# Patient Record
Sex: FEMALE | Race: BLACK OR AFRICAN AMERICAN
[De-identification: names, ages, dates, MRNs, and addresses within clinical notes are randomized per-mention and may not be internally consistent; named-entity substitution may affect disease eponyms.]

---

## 2016-09-01 VITALS — SYSTOLIC BLOOD PRESSURE: 159 MMHG | DIASTOLIC BLOOD PRESSURE: 101 MMHG

## 2017-02-09 ENCOUNTER — HOSPITAL ENCOUNTER (EMERGENCY)
Dept: HOSPITAL 61 - ER | Age: 32
Discharge: HOME | End: 2017-02-09
Payer: SELF-PAY

## 2017-02-09 VITALS — HEIGHT: 66 IN | WEIGHT: 293 LBS | BODY MASS INDEX: 47.09 KG/M2
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
## 2017-02-09 DIAGNOSIS — Z88.6: ICD-10-CM
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
## 2017-02-09 DIAGNOSIS — M54.32: ICD-10-CM
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
## 2017-02-09 DIAGNOSIS — S39.012A: Primary | ICD-10-CM

## 2017-02-09 PROCEDURE — 99283 EMERGENCY DEPT VISIT LOW MDM: CPT

## 2017-02-09 NOTE — PHYS DOC
Past Medical History


Past Medical History:  No Pertinent History


Past Surgical History:  Other


Additional Past Surgical Histo:  SPLEENECTOMY


Alcohol Use:  None


Drug Use:  None





Adult General


Chief Complaint


Chief Complaint:  BACK PAIN OR INJURY





HPI


HPI


Patient is a 31  year old female who presents with moderate left low back pain 

radiating to the left lower extremity that began 3 days ago after she moved a 

TV. Patient denies any loss of bowel bladder function. Denies any numbness or 

tingling to bilateral lower extremities. Patient denies any urgency frequency 

or dysuria.





Review of Systems


Review of Systems





Constitutional: Denies fever or chills []


Eyes: Denies change in visual acuity, redness, or eye pain []


: Denies dysuria or hematuria []


Musculoskeletal: Left low back pain 


Integument: Denies rash or skin lesions []


Neurologic: Denies headache, focal weakness or sensory changes []


Endocrine: Denies polyuria or polydipsia []





Allergies


Allergies





 Allergies








Coded Allergies Type Severity Reaction Last Updated Verified


 


  ibuprofen Allergy Unknown  1/30/16 Yes











Physical Exam


Physical Exam





Constitutional: Well developed, well nourished, no acute distress, non-toxic 

appearance. []


Abdomen: Bowel sounds normal, soft, no tenderness, no masses, no pulsatile 

masses. [] 


Skin: Warm, dry, no erythema, no rash. [] 


Back: Overweight patient. Diffuse tenderness paraspinal muscles of the left 

lower lumbar region worse on the left SI joint, no midline tenderness, no CVA 

tenderness. Positive left leg straight raises, negative right leg straight 

raises.


Extremities: No tenderness, no cyanosis, no clubbing, ROM intact, no edema. [] 


Neurologic: Alert and oriented X 3, normal motor function, normal sensory 

function, no focal deficits noted. []


Psychologic: Affect normal, judgement normal, mood normal. []





EKG


EKG


[]





Radiology/Procedures


Radiology/Procedures


[]





Course & Med Decision Making


Course & Med Decision Making


Pertinent Labs and Imaging studies reviewed. (See chart for details)





Patient is in the ED with left low back pain radiating to the left lower 

extremity that began 3 days ago after moving a TV. She does not have any loss 

of bowel/bladder function. Her pain is very muscle skeletal most likely lumbar 

strain. She was discharged with pain medicine. Heat recommended to her back. 

Follow-up with her own PCP in one week. She is provided return precautions and 

discharged in stable condition.





Dragon Disclaimer


Dragon Disclaimer


This electronic medical record was generated, in whole or in part, using a 

voice recognition dictation system.





Departure


Departure


Impression:  


 Primary Impression:  


 Lumbosacral strain


 Additional Impression:  


 Sciatica of left side


Disposition:  01 HOME, SELF-CARE


Condition:  STABLE


Referrals:  


NO PCP (PCP)


Follow-up with your own doctor in one week


Patient Instructions:  Lumbosacral Strain, Sciatica with [facility]





Additional Instructions:


You were seen for strain of the lumbar spine. Avoid lifting anything heavy for 

the next 7 days. Apply heat or ice to the low back. Take the prescribed 

medicines as ordered. Come back to the emergency room for any concerning or 

worsening symptoms. Follow-up with your own doctor in one week.


Scripts


Cyclobenzaprine Hcl 10 Mg Tablet1 Tab PO TID #30 TAB


   Prov:MICAELA KIRKPATRICK         2/9/17


Hydrocodone/Apap 5-325 (Norco 5-325 Tablet)1 Each Tablet1-2 Tab PO Q4-6HRS #20 

TAB


   Prov:MICAELA KIRKPATRICK         2/9/17





Problem Qualifiers








 Primary Impression:  


 Lumbosacral strain


 Encounter type:  initial encounter  Qualified Code:  S39.012A - Strain of 

muscle, fascia and tendon of lower back, initial encounter





MICAELA KIRKPATRICK Feb 9, 2017 00:44

## 2018-02-13 ENCOUNTER — HOSPITAL ENCOUNTER (EMERGENCY)
Dept: HOSPITAL 61 - ER | Age: 33
Discharge: HOME | End: 2018-02-13
Payer: SELF-PAY

## 2018-02-13 DIAGNOSIS — S93.402A: Primary | ICD-10-CM
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
## 2018-02-13 DIAGNOSIS — I10: ICD-10-CM

## 2018-02-13 DIAGNOSIS — Z88.6: ICD-10-CM
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
## 2018-02-13 PROCEDURE — 73610 X-RAY EXAM OF ANKLE: CPT

## 2018-02-13 PROCEDURE — 99284 EMERGENCY DEPT VISIT MOD MDM: CPT

## 2018-06-08 ENCOUNTER — HOSPITAL ENCOUNTER (EMERGENCY)
Dept: HOSPITAL 61 - ER | Age: 33
LOS: 1 days | Discharge: HOME | End: 2018-06-09
Payer: SELF-PAY

## 2018-06-08 DIAGNOSIS — Z88.6: ICD-10-CM

## 2018-06-08 DIAGNOSIS — R10.9: Primary | ICD-10-CM

## 2018-06-08 DIAGNOSIS — E66.01: ICD-10-CM

## 2018-06-08 DIAGNOSIS — R11.2: ICD-10-CM

## 2018-06-08 DIAGNOSIS — I10: ICD-10-CM

## 2018-06-08 LAB — URINE HCG POC: (no result)

## 2018-06-08 PROCEDURE — 81001 URINALYSIS AUTO W/SCOPE: CPT

## 2018-06-08 PROCEDURE — 99285 EMERGENCY DEPT VISIT HI MDM: CPT

## 2018-06-08 PROCEDURE — 81025 URINE PREGNANCY TEST: CPT

## 2018-06-08 PROCEDURE — 96372 THER/PROPH/DIAG INJ SC/IM: CPT

## 2018-06-08 PROCEDURE — 74176 CT ABD & PELVIS W/O CONTRAST: CPT

## 2018-06-08 RX ADMIN — BACITRACIN 1 MLS/HR: 5000 INJECTION, POWDER, FOR SOLUTION INTRAMUSCULAR at 23:45

## 2018-06-08 RX ADMIN — KETOROLAC TROMETHAMINE 1 MG: 30 INJECTION, SOLUTION INTRAMUSCULAR at 23:45

## 2018-06-09 LAB
BACTERIA,URINE: (no result) /HPF
BILIRUBIN,URINE: NEGATIVE
CLARITY,URINE: CLEAR
COLOR,URINE: YELLOW
GLUCOSE,URINE: NEGATIVE MG/DL
NITRITE,URINE: NEGATIVE
PH,URINE: 5.5
PROTEIN,URINE: NEGATIVE MG/DL
RBC,URINE: (no result) /HPF (ref 0–2)
SPECIFIC GRAVITY,URINE: 1.01
SQUAMOUS EPITHELIAL CELL,UR: (no result) /LPF
UROBILINOGEN,URINE: 0.2 MG/DL
WBC,URINE: (no result) /HPF (ref 0–4)

## 2018-06-09 RX ADMIN — KETOROLAC TROMETHAMINE 1 MG: 30 INJECTION, SOLUTION INTRAMUSCULAR at 00:39

## 2019-04-15 ENCOUNTER — HOSPITAL ENCOUNTER (EMERGENCY)
Dept: HOSPITAL 61 - ER | Age: 34
Discharge: HOME | End: 2019-04-15
Payer: SELF-PAY

## 2019-04-15 VITALS — BODY MASS INDEX: 47.09 KG/M2 | WEIGHT: 293 LBS | HEIGHT: 66 IN

## 2019-04-15 VITALS — SYSTOLIC BLOOD PRESSURE: 163 MMHG | DIASTOLIC BLOOD PRESSURE: 101 MMHG

## 2019-04-15 DIAGNOSIS — R51: ICD-10-CM

## 2019-04-15 DIAGNOSIS — Z88.8: ICD-10-CM

## 2019-04-15 DIAGNOSIS — M79.18: ICD-10-CM

## 2019-04-15 DIAGNOSIS — Z90.81: ICD-10-CM

## 2019-04-15 DIAGNOSIS — I10: Primary | ICD-10-CM

## 2019-04-15 PROCEDURE — 99284 EMERGENCY DEPT VISIT MOD MDM: CPT

## 2019-04-15 NOTE — PHYS DOC
Past Medical History


Past Medical History:  Hypertension, Other


Additional Past Medical Histor:  tbi


Past Surgical History:  Other


Additional Past Surgical Histo:  SPLEENECTOMY, l shoulder


Alcohol Use:  None


Drug Use:  None





Adult General


Chief Complaint


Chief Complaint:  LOWER BACK PAIN OR INJURY





HPI


HPI





Patient is a 33  year old female with a history of obesity, hypertension, who 

presents to the ED today complaining of 10 out of 10 left-sided back pain that 

has been going on since yesterday, patient states she believes she slept wrong. 

Patient states the pain is exacerbated by movements to the left side. Denies 

anything specifically relieving the pain. She states she has not tried anything 

specifically for this pain. She is also complaining of a headache rated at 10 

out of 10 but not the worst headache in her life, she states this headache has 

been going on for the last 1 month. She states she has history of migraine 

headaches. She also has history of hypertension and states has not taken her 

medications for "years". Patient denies any chest pain or shortness of breath. 

Denies anything relieving or exacerbating the headache. She states she does not 

have insurance has no PCP has never follows up with anyone for high blood 

pressure.





Review of Systems


Review of Systems





Constitutional: Denies fever or chills []


Eyes: Denies change in visual acuity, redness, or eye pain []


HENT: Denies nasal congestion or sore throat []


Respiratory: Denies cough or shortness of breath []


Cardiovascular: No additional information not addressed in HPI []


GI: Denies abdominal pain, nausea, vomiting, bloody stools or diarrhea []


: Denies dysuria or hematuria []


Musculoskeletal: Reports left-sided pain


Integument: Denies rash or skin lesions []


Neurologic: Reports headache, denies focal weakness or sensory changes []








All other systems were reviewed and found to be within normal limits, except as 

documented in this note.





Current Medications


Current Medications





Current Medications








 Medications


  (Trade)  Dose


 Ordered  Sig/Olvin  Start Time


 Stop Time Status Last Admin


Dose Admin


 


 Acetaminophen/


 Hydrocodone Bitart


  (Lortab 5/325)  2 tab  1X  ONCE  4/15/19 11:30


 4/15/19 11:34 DC 4/15/19 11:47


2 TAB


 


 Clonidine HCl


  (Catapres)  0.1 mg  1X  ONCE  4/15/19 11:30


 4/15/19 11:34 DC 4/15/19 11:46


0.1 MG


 


 Diazepam


  (Valium)  5 mg  1X  ONCE  4/15/19 11:30


 4/15/19 11:34 DC 4/15/19 11:43


5 MG











Allergies


Allergies





Allergies








Coded Allergies Type Severity Reaction Last Updated Verified


 


  ibuprofen Adverse Reaction Mild UPSET STOMACH 6/8/18 Yes











Physical Exam


Physical Exam





Constitutional: Morbidly obese patient. Well developed, well nourished, no 

acute distress, non-toxic appearance. []


HENT: Normocephalic, atraumatic, bilateral external ears normal, oropharynx 

moist, no oral exudates, nose normal. []


Eyes: PERRLA, EOMI, conjunctiva normal, no discharge. [] 


Neck: Normal range of motion, no tenderness, supple, no stridor. [] 


Cardiovascular:Heart rate regular rhythm, no murmur []


Lungs & Thorax:  Bilateral breath sounds clear to auscultation []


Abdomen: Bowel sounds normal, soft, no tenderness, no masses, no pulsatile 

masses. [] 


Skin: Warm, dry, no erythema, no rash. [] 


Back: No tenderness, no CVA tenderness. [] 


Extremities: No tenderness, no cyanosis, no clubbing, ROM intact, no edema. [] 


Neurologic: Alert and oriented X 3, normal motor function, normal sensory 

function, no focal deficits noted. Cranial nerves II through XII intact


Psychologic: Affect normal, judgement normal, mood normal. []





Current Patient Data


Vital Signs





 Vital Signs








  Date Time  Temp Pulse Resp B/P (MAP) Pulse Ox O2 Delivery O2 Flow Rate FiO2


 


4/15/19 12:14  73 16 175/117 (136) 100 Room Air  


 


4/15/19 11:30 97.9       





 97.9       











EKG


EKG


[]





Radiology/Procedures


Radiology/Procedures


[]





Course & Med Decision Making


Course & Med Decision Making


Pertinent Labs and Imaging studies reviewed. (See chart for details)





This is a 33-year-old female patient presenting to the ED today complaining of 

pain to her left side of the body that has been going on since yesterday, she 

believes she slept wrong. Patient is in no distress. She is also complaining of 

a headache, she believes is a migraine headache, has history of hypertension 

that is uncontrolled, blood pressure not arrival 173/119 with a heart rate of 

84. Discussed with patient blood pressure management. She states she would like 

to be restarted back on her blood pressure medication. She was started on HCTZ 

and she is morbidly obese, recommended she tries to diet and exercise and lose 

some weight. She was discharged with diclofenac and cyclobenzaprine for pain. 

Provided a clinic list for follow-up.





Dragon Disclaimer


Dragon Disclaimer


This electronic medical record was generated, in whole or in part, using a 

voice recognition dictation system.





Departure


Departure


Impression:  


 Primary Impression:  


 Musculoskeletal pain


 Additional Impressions:  


 Hypertension


 Headache


Disposition:  01 HOME, SELF-CARE


Condition:  STABLE


Referrals:  


NO PCP (PCP)


follow up with a doctor from the list provided


Patient Instructions:  Headache, FAQs, Hypertension





Additional Instructions:  


You were evaluated in the emergency room, your blood pressure was elevated, you 

have history of high blood pressure. Please establish care with one of the 

doctors from the list provided and follow-up. We put you on 

hydrochlorothiazide. Take it as prescribed. Consider losing weight, watch your 

diet and avoid eating fatty salty foods.


Scripts


Ondansetron (ONDANSETRON ODT) 4 Mg Tab.rapdis


1 TAB PO PRN Q6-8HRS, #16 TAB


   Prov: MICAELA KIRKPATRICK         4/15/19 


Diclofenac Sodium (DICLOFENAC SODIUM) 50 Mg Tablet.dr


1 TAB PO BID, #20 TAB 0 Refills


   Prov: MICAELA KIRKPATRICK         4/15/19 


Hydrochlorothiazide (HYDROCHLOROTHIAZIDE TABLET) 12.5 Mg Tablet


12.5 MG PO DAILY for DIURETIC, #20 TAB 0 Refills


   Prov: MICAELA KIRKPATRICK         4/15/19 


Cyclobenzaprine Hcl (CYCLOBENZAPRINE HCL) 10 Mg Tablet


1 TAB PO TID, #30 TAB


   Prov: MICAELA KIRKPATRICK         4/15/19





Problem Qualifiers








 Additional Impressions:  


 Hypertension


 Hypertension type:  unspecified  Qualified Codes:  I10 - Essential (primary) 

hypertension


 Headache


 Headache type:  unspecified  Headache chronicity pattern:  unspecified pattern

  Intractability:  not intractable  Qualified Codes:  R51 - Headache








CASIMIROMICAELA Apr 15, 2019 11:56

## 2020-05-21 ENCOUNTER — HOSPITAL ENCOUNTER (EMERGENCY)
Dept: HOSPITAL 61 - ER | Age: 35
Discharge: HOME | End: 2020-05-21
Payer: SELF-PAY

## 2020-05-21 VITALS — DIASTOLIC BLOOD PRESSURE: 99 MMHG | SYSTOLIC BLOOD PRESSURE: 168 MMHG

## 2020-05-21 VITALS — HEIGHT: 67 IN | BODY MASS INDEX: 45.99 KG/M2 | WEIGHT: 293 LBS

## 2020-05-21 DIAGNOSIS — F17.200: ICD-10-CM

## 2020-05-21 DIAGNOSIS — R10.30: Primary | ICD-10-CM

## 2020-05-21 DIAGNOSIS — Z88.8: ICD-10-CM

## 2020-05-21 DIAGNOSIS — I10: ICD-10-CM

## 2020-05-21 DIAGNOSIS — R30.0: ICD-10-CM

## 2020-05-21 LAB
ALBUMIN SERPL-MCNC: 3.3 G/DL (ref 3.4–5)
ALBUMIN/GLOB SERPL: 0.7 {RATIO} (ref 1–1.7)
ALP SERPL-CCNC: 59 U/L (ref 46–116)
ALT SERPL-CCNC: 22 U/L (ref 14–59)
ANION GAP SERPL CALC-SCNC: 8 MMOL/L (ref 6–14)
APTT PPP: YELLOW S
AST SERPL-CCNC: 16 U/L (ref 15–37)
BACTERIA #/AREA URNS HPF: 0 /HPF
BASOPHILS # BLD AUTO: 0 X10^3/UL (ref 0–0.2)
BASOPHILS NFR BLD: 0 % (ref 0–3)
BILIRUB SERPL-MCNC: 1.2 MG/DL (ref 0.2–1)
BILIRUB UR QL STRIP: NEGATIVE
BUN SERPL-MCNC: 8 MG/DL (ref 7–20)
BUN/CREAT SERPL: 8 (ref 6–20)
CALCIUM SERPL-MCNC: 8.5 MG/DL (ref 8.5–10.1)
CHLORIDE SERPL-SCNC: 104 MMOL/L (ref 98–107)
CO2 SERPL-SCNC: 27 MMOL/L (ref 21–32)
CREAT SERPL-MCNC: 1 MG/DL (ref 0.6–1)
EOSINOPHIL NFR BLD: 0.1 X10^3/UL (ref 0–0.7)
EOSINOPHIL NFR BLD: 1 % (ref 0–3)
ERYTHROCYTE [DISTWIDTH] IN BLOOD BY AUTOMATED COUNT: 15 % (ref 11.5–14.5)
FIBRINOGEN PPP-MCNC: CLEAR MG/DL
GFR SERPLBLD BASED ON 1.73 SQ M-ARVRAT: 76.8 ML/MIN
GLOBULIN SER-MCNC: 4.8 G/DL (ref 2.2–3.8)
GLUCOSE SERPL-MCNC: 95 MG/DL (ref 70–99)
HCT VFR BLD CALC: 47.9 % (ref 36–47)
HGB BLD-MCNC: 16.5 G/DL (ref 12–15.5)
HYALINE CASTS #/AREA URNS LPF: (no result) /HPF
LIPASE: 74 U/L (ref 73–393)
LYMPHOCYTES # BLD: 1.4 X10^3/UL (ref 1–4.8)
LYMPHOCYTES NFR BLD AUTO: 14 % (ref 24–48)
MCH RBC QN AUTO: 30 PG (ref 25–35)
MCHC RBC AUTO-ENTMCNC: 35 G/DL (ref 31–37)
MCV RBC AUTO: 87 FL (ref 79–100)
MONO #: 0.8 X10^3/UL (ref 0–1.1)
MONOCYTES NFR BLD: 8 % (ref 0–9)
NEUT #: 7.8 X10^3/UL (ref 1.8–7.7)
NEUTROPHILS NFR BLD AUTO: 77 % (ref 31–73)
NITRITE UR QL STRIP: NEGATIVE
PH UR STRIP: 5.5 [PH]
PLATELET # BLD AUTO: 444 X10^3/UL (ref 140–400)
POTASSIUM SERPL-SCNC: 3.8 MMOL/L (ref 3.5–5.1)
PROT SERPL-MCNC: 8.1 G/DL (ref 6.4–8.2)
PROT UR STRIP-MCNC: 100 MG/DL
RBC # BLD AUTO: 5.49 X10^6/UL (ref 3.5–5.4)
RBC #/AREA URNS HPF: 0 /HPF (ref 0–2)
SODIUM SERPL-SCNC: 139 MMOL/L (ref 136–145)
SQUAMOUS #/AREA URNS LPF: (no result) /LPF
UROBILINOGEN UR-MCNC: 0.2 MG/DL
WBC # BLD AUTO: 10.1 X10^3/UL (ref 4–11)
WBC #/AREA URNS HPF: (no result) /HPF (ref 0–4)

## 2020-05-21 PROCEDURE — 74177 CT ABD & PELVIS W/CONTRAST: CPT

## 2020-05-21 PROCEDURE — 81001 URINALYSIS AUTO W/SCOPE: CPT

## 2020-05-21 PROCEDURE — 81025 URINE PREGNANCY TEST: CPT

## 2020-05-21 PROCEDURE — 85025 COMPLETE CBC W/AUTO DIFF WBC: CPT

## 2020-05-21 PROCEDURE — 96372 THER/PROPH/DIAG INJ SC/IM: CPT

## 2020-05-21 PROCEDURE — 83690 ASSAY OF LIPASE: CPT

## 2020-05-21 PROCEDURE — 36415 COLL VENOUS BLD VENIPUNCTURE: CPT

## 2020-05-21 PROCEDURE — 96374 THER/PROPH/DIAG INJ IV PUSH: CPT

## 2020-05-21 PROCEDURE — 99285 EMERGENCY DEPT VISIT HI MDM: CPT

## 2020-05-21 PROCEDURE — 96375 TX/PRO/DX INJ NEW DRUG ADDON: CPT

## 2020-05-21 PROCEDURE — 80053 COMPREHEN METABOLIC PANEL: CPT

## 2020-05-21 NOTE — PHYS DOC
Past Medical History


Past Medical History:  Hypertension, Other


Additional Past Medical Histor:  tbi


Past Surgical History:  Other


Additional Past Surgical Histo:  SPLEENECTOMY, l shoulder


Smoking Status:  Current Every Day Smoker


Alcohol Use:  None


Drug Use:  None





General Adult


EDM:


Chief Complaint:  ABDOMINAL PAIN





HPI:


HPI:





Patient is a 34-year-old obese female who presents with a complaint of low 

abdominal pain and cramping.  She states it feels like her menstrual period.  

She also has dysuria as well.  She denies any fever chills or sweats.  She is 

had no nausea or vomiting.  No diarrhea.  She states the pain is localized to 

her lower abdomen feels like cramping and she says it is worse when she lays 

down.  []





Review of Systems:


Review of Systems:


Constitutional:  Denies fever or chills. []


Eyes:  Denies change in visual acuity. []


HENT:  Denies nasal congestion or sore throat. [] 


Respiratory:  Denies cough or shortness of breath. [] 


Cardiovascular:  Denies chest pain or edema. [] 


GI: Per HPI [] 


: Reports dysuria. [] 


Musculoskeletal:  Denies back pain or joint pain. [] 


Integument:  Denies rash. [] 


Neurologic:  Denies headache, focal weakness or sensory changes. [] 


Endocrine:  Denies polyuria or polydipsia. [] 


Lymphatic:  Denies swollen glands. [] 


Psychiatric:  Denies depression or anxiety. []





Heart Score:


Risk Factors:


Risk Factors:  DM, Current or recent (<one month) smoker, HTN, HLP, family 

history of CAD, obesity.


Risk Scores:


Score 0 - 3:  2.5% MACE over next 6 weeks - Discharge Home


Score 4 - 6:  20.3% MACE over next 6 weeks - Admit for Clinical Observation


Score 7 - 10:  72.7% MACE over next 6 weeks - Early Invasive Strategies





Allergies:


Allergies:





Allergies








Coded Allergies Type Severity Reaction Last Updated Verified


 


  ibuprofen Adverse Reaction Mild UPSET STOMACH 6/8/18 Yes











Physical Exam:


PE:





Constitutional: Well developed, well nourished, no acute distress, non-toxic 

appearance. []


HENT: Normocephalic, atraumatic, bilateral external ears normal, oropharynx 

moist, no oral exudates, nose normal. []


Eyes: PERRLA, EOMI, conjunctiva normal, no discharge. [] 


Neck: Normal range of motion, no tenderness, supple, no stridor. [] 


Cardiovascular:Heart rate regular rhythm, no murmur []


Lungs & Thorax:  Bilateral breath sounds clear to auscultation []


Abdomen: Bowel sounds normal, soft, no tenderness, no masses, no pulsatile 

masses. [] 


Skin: Warm, dry, no erythema, no rash. [] 


Back: No tenderness, no CVA tenderness. [] 


Extremities: No tenderness, no cyanosis, no clubbing, ROM intact, no edema. [] 


Neurologic: Alert and oriented X 3, normal motor function, normal sensory 

function, no focal deficits noted. []


Psychologic: Affect normal, judgement normal, mood normal. []





Current Patient Data:


Labs:





                                Laboratory Tests








Test


 5/21/20


08:39


 


POC Urine HCG, Qualitative


 Hcg negative


(Negative)











EKG:


EKG:


[]





Radiology/Procedures:


Radiology/Procedures:


[]PROCEDURE: CT ABD PELV W/ IV CONTRST ONLY





 


CT abdomen and pelvis with contrast  


 


History: Abdominal pain


 


Technique:  After the administration of intravenous contrast, CT imaging 


was performed of the abdomen and pelvis. No oral contrast was given as per


request. Multiplanar images are reviewed.  Exposure: One or more of the 


following individualized dose reduction techniques were utilized for this 


examination:  1. Automated exposure control  2. Adjustment of the mA 


and/or kV according to patient size  3. Use of iterative reconstruction 


technique.


 


Comparison:  June 9, 2018


 


Findings:


Visualized heart is enlarged. There is air-fluid level of the distal 


esophagus, present previously. There is again inferior vena cava filter. 


Both kidneys enhance, no hydronephrosis of either kidney. There are 


probably some tiny superior left renal calculi up to about 2 mm. 


Gallbladder is present without obvious intraluminal abnormality by CT. No 


new focal abnormality is identified of the liver or pancreas. Spleen is 


again absent. Appendix caliber is upper limits of normal at 6 mm although 


unchanged, no adjacent inflammatory change. There are some nonspecific 


air-fluid levels in the small bowel most notable of the left abdomen. 


There is variable small bowel wall thickening including of the more distal


small bowel. Bowel is not significantly dilated. There is no free air. 


There is trace dependent free fluid in the right pelvis. There is 


multilevel lumbar degenerative disc disease. There is mild S-shaped 


scoliosis of the thoracolumbar spine.


 


Impression:


1.  There is variable small bowel wall thickening and air-fluid levels, 


evidence of enteritis. There is no CT evidence of acute appendicitis.


2. There are some small nonobstructive left renal calculi superiorly.


3. Heart is enlarged.





Course & Med Decision Making:


Course & Med Decision Making


Pertinent Labs and Imaging studies reviewed. (See chart for details)





[ED course: Evaluation reveals an obese 34-year-old female with lower abdominal 

discomfort.  CT scan showed mild enteritis nothing emergent for sure.  She was 

given IV fluids Toradol and some fentanyl with improvement in her symptoms.  I 

will give her some naproxen to take at home.  I have encouraged her to follow 

with her primary care physician.]





Buck Disclaimer:


Dragon Disclaimer:


This electronic medical record was generated, in whole or in part, using a voice

recognition dictation system.





Departure


Departure


Impression:  


   Primary Impression:  


   Abdominal pain


   Qualified Codes:  R10.30 - Lower abdominal pain, unspecified


Disposition:  01 HOME, SELF-CARE


Condition:  IMPROVED


Referrals:  


NO PCP (PCP)


Patient Instructions:  Abdominal Pain (Nonspecific)





Additional Instructions:  


Return to the emergency department with any new or concerning symptoms


Scripts


Naproxen (NAPROXEN) 500 Mg Tablet


1 TAB PO BID PRN for PAIN, #30 TAB 1 Refill


   Prov: ITZ STOLL DO         5/21/20











ITZ STOLL DO             May 21, 2020 08:46

## 2020-05-21 NOTE — RAD
CT abdomen and pelvis with contrast  

 

History: Abdominal pain

 

Technique:  After the administration of intravenous contrast, CT imaging 

was performed of the abdomen and pelvis. No oral contrast was given as per

request. Multiplanar images are reviewed.  Exposure: One or more of the 

following individualized dose reduction techniques were utilized for this 

examination:  1. Automated exposure control  2. Adjustment of the mA 

and/or kV according to patient size  3. Use of iterative reconstruction 

technique.

 

Comparison:  June 9, 2018

 

Findings:

Visualized heart is enlarged. There is air-fluid level of the distal 

esophagus, present previously. There is again inferior vena cava filter. 

Both kidneys enhance, no hydronephrosis of either kidney. There are 

probably some tiny superior left renal calculi up to about 2 mm. 

Gallbladder is present without obvious intraluminal abnormality by CT. No 

new focal abnormality is identified of the liver or pancreas. Spleen is 

again absent. Appendix caliber is upper limits of normal at 6 mm although 

unchanged, no adjacent inflammatory change. There are some nonspecific 

air-fluid levels in the small bowel most notable of the left abdomen. 

There is variable small bowel wall thickening including of the more distal

small bowel. Bowel is not significantly dilated. There is no free air. 

There is trace dependent free fluid in the right pelvis. There is 

multilevel lumbar degenerative disc disease. There is mild S-shaped 

scoliosis of the thoracolumbar spine.

 

Impression:

1.  There is variable small bowel wall thickening and air-fluid levels, 

evidence of enteritis. There is no CT evidence of acute appendicitis.

2. There are some small nonobstructive left renal calculi superiorly.

3. Heart is enlarged.

 

 

Electronically signed by: Luis Rendon MD (5/21/2020 11:57 AM) 

[id number]

## 2021-01-31 ENCOUNTER — HOSPITAL ENCOUNTER (EMERGENCY)
Dept: HOSPITAL 61 - ER | Age: 36
Discharge: HOME | End: 2021-01-31
Payer: SELF-PAY

## 2021-01-31 VITALS — SYSTOLIC BLOOD PRESSURE: 169 MMHG | DIASTOLIC BLOOD PRESSURE: 79 MMHG

## 2021-01-31 VITALS — BODY MASS INDEX: 45.99 KG/M2 | WEIGHT: 293 LBS | HEIGHT: 67 IN

## 2021-01-31 DIAGNOSIS — Z90.89: ICD-10-CM
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
## 2021-01-31 DIAGNOSIS — Z88.8: ICD-10-CM

## 2021-01-31 DIAGNOSIS — I10: ICD-10-CM

## 2021-01-31 DIAGNOSIS — F17.200: ICD-10-CM

## 2021-01-31 DIAGNOSIS — Z98.890: ICD-10-CM

## 2021-01-31 DIAGNOSIS — S60.561A: Primary | ICD-10-CM
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
## 2021-01-31 PROCEDURE — 99283 EMERGENCY DEPT VISIT LOW MDM: CPT

## 2021-01-31 NOTE — PHYS DOC
Past Medical History


Past Medical History:  Hypertension, Other


Additional Past Medical Histor:  tbi


Past Surgical History:  Other


Additional Past Surgical Histo:  SPLEENECTOMY, l shoulder


Smoking Status:  Current Every Day Smoker


Alcohol Use:  Occasionally


Drug Use:  None





General Adult


EDM:


Chief Complaint:  INSECT BITE





HPI:


HPI:





Patient is a 35  year old female who presented to ER for evaluation of an insect

bite to the right hand she noted yesterday.. Patient says she woke up from sleep

yesterday, noted a small lesion on her right hand, tender to touch, she 

suspected that something bit her on the right hand.  Patient denies any fever.  

Patient denies any fall.





Review of Systems:


Review of Systems:


Constitutional:   Denies fever or chills. []


Eyes:   Denies change in visual acuity. []


HENT:   Denies nasal congestion or sore throat. [] 


Respiratory:   Denies cough or shortness of breath. [] 


Cardiovascular:   Denies chest pain or edema. [] 


GI:   Denies abdominal pain, nausea, vomiting, bloody stools or diarrhea. [] 


:  Denies dysuria. [] 


Musculoskeletal:   Denies back pain or joint pain. [] 


Integument:   positive for rash


Neurologic:   Denies headache, focal weakness or sensory changes. [] 


Endocrine:   Denies polyuria or polydipsia. [] 


Lymphatic:  Denies swollen glands. [] 


Psychiatric:  Denies depression or anxiety. []





Heart Score:


Risk Factors:


Risk Factors:  DM, Current or recent (<one month) smoker, HTN, HLP, family 

history of CAD, obesity.


Risk Scores:


Score 0 - 3:  2.5% MACE over next 6 weeks - Discharge Home


Score 4 - 6:  20.3% MACE over next 6 weeks - Admit for Clinical Observation


Score 7 - 10:  72.7% MACE over next 6 weeks - Early Invasive Strategies





Current Medications:





Current Medications








 Medications


  (Trade)  Dose


 Ordered  Sig/Olvin  Start Time


 Stop Time Status Last Admin


Dose Admin


 


 Cephalexin HCl


  (Keflex)  1,000 mg  1X  ONCE  1/31/21 03:00


 1/31/21 03:01   














Allergies:


Allergies:





Allergies








Coded Allergies Type Severity Reaction Last Updated Verified


 


  ibuprofen Adverse Reaction Mild UPSET STOMACH 6/8/18 Yes











Physical Exam:


PE:





Constitutional: Well developed, well nourished, no acute distress, non-toxic 

appearance. []











Skin: Warm, dry, There is small skin lesion on right hand at the webspace 

between thumb and index finger.No swelling.  . [] 


] 


Extremities: No tenderness, no cyanosis, no clubbing, ROM intact, no edema. [] 


Neurologic: Alert and oriented X 3, normal motor function, normal sensory 

function, no focal deficits noted. []


Psychologic: Affect normal, judgement normal, mood normal. []





EKG:


EKG:


[]





Radiology/Procedures:


Radiology/Procedures:


[]





Course & Med Decision Making:


Course & Med Decision Making


Pertinent Labs and Imaging studies reviewed. (See chart for details)





[]





Dragon Disclaimer:


Dragon Disclaimer:


This electronic medical record was generated, in whole or in part, using a voice

 recognition dictation system.





Departure


Departure


Impression:  


   Primary Impression:  


   Insect bite


   Additional Impression:  


   Insect bite of right hand


Disposition:  01 DC HOME SELF CARE/HOMELESS


Condition:  STABLE


Referrals:  


NO PCP (PCP)


follow up with your doctor on Monday for reevaluation


Patient Instructions:  Insect Bite





Additional Instructions:  


Thank you for visiting our Emergency Department. We appreciate you trusting us 

with your care. If any additional problems come up don't hesitate to return to 

visit us. Please follow up with your primary care provider so they can plan 

additional care if needed and know about the problem that you had. If symptoms 

worsen come back to the Emergency Department. Any concerning symptoms that start

 such as chest pain, shortness of air, weakness or numbness on one side of the 

body, running high fevers or any other concerning symptoms return to the ER.


Scripts


Cephalexin (CEPHALEXIN) 500 Mg Tablet


1 TAB PO QID for 7 Days, #28 TAB


   Prov: MATI AYALA DO         1/31/21











MATI AYALA DO                Jan 31, 2021 02:42

## 2021-08-05 ENCOUNTER — HOSPITAL ENCOUNTER (EMERGENCY)
Dept: HOSPITAL 61 - ER | Age: 36
Discharge: HOME | End: 2021-08-05
Payer: SELF-PAY

## 2021-08-05 VITALS — DIASTOLIC BLOOD PRESSURE: 104 MMHG | SYSTOLIC BLOOD PRESSURE: 197 MMHG

## 2021-08-05 VITALS — WEIGHT: 293 LBS | HEIGHT: 67 IN | BODY MASS INDEX: 45.99 KG/M2

## 2021-08-05 DIAGNOSIS — I10: ICD-10-CM

## 2021-08-05 DIAGNOSIS — Z88.8: ICD-10-CM

## 2021-08-05 DIAGNOSIS — F17.200: ICD-10-CM

## 2021-08-05 DIAGNOSIS — Z90.81: ICD-10-CM

## 2021-08-05 DIAGNOSIS — M25.551: Primary | ICD-10-CM

## 2021-08-05 PROCEDURE — 99284 EMERGENCY DEPT VISIT MOD MDM: CPT

## 2021-08-05 PROCEDURE — 73502 X-RAY EXAM HIP UNI 2-3 VIEWS: CPT

## 2021-08-05 NOTE — PHYS DOC
Past Medical History


Past Medical History:  Hypertension, Other


Additional Past Medical Histor:  tbi


Past Surgical History:  Splenectomy


Additional Past Surgical Histo:  SPLEENECTOMY, l shoulder


Smoking Status:  Current Every Day Smoker


Alcohol Use:  None


Drug Use:  None





General Adult


EDM:


Chief Complaint:  HIP PAIN





HPI:


HPI:





Patient is a 36  year old female with history of hypertension presenting today 

complaining of 11 out of 10 sharp constant right hip pain that began this 

morning after she got up.  Patient denies any injuries.  States the pain is 

worse on certain movements, and certain sitting positions.  Denies anything 

specifically relieving the pain.





Review of Systems:


Review of Systems:


Constitutional:   Denies fever or chills. []


Musculoskeletal:   Reports right hip pain.  Denies back pain or joint pain. [] 


Integument:   Denies rash. [] 


Neurologic:   Denies headache, focal weakness or sensory changes. [] 


Psychiatric:  Denies depression or anxiety. []





Heart Score:


C/O Chest Pain:  N/A


Risk Factors:


Risk Factors:  DM, Current or recent (<one month) smoker, HTN, HLP, family 

history of CAD, obesity.


Risk Scores:


Score 0 - 3:  2.5% MACE over next 6 weeks - Discharge Home


Score 4 - 6:  20.3% MACE over next 6 weeks - Admit for Clinical Observation


Score 7 - 10:  72.7% MACE over next 6 weeks - Early Invasive Strategies





Current Medications:





Current Medications








 Medications


  (Trade)  Dose


 Ordered  Sig/Olvin  Start Time


 Stop Time Status Last Admin


Dose Admin


 


 Acetaminophen/


 Hydrocodone Bitart


  (Lortab 5/325)  2 tab  1X  ONCE  8/5/21 12:15


 8/5/21 12:21 DC 8/5/21 12:42


2 TAB


 


 Cyclobenzaprine


 HCl


  (Flexeril)  10 mg  1X  ONCE  8/5/21 12:15


 8/5/21 12:21 DC 8/5/21 12:39


10 MG


 


 Prednisone


  (Prednisone)  60 mg  1X  ONCE  8/5/21 12:15


 8/5/21 12:21 DC 8/5/21 12:43


20 MG











Allergies:


Allergies:





Allergies








Coded Allergies Type Severity Reaction Last Updated Verified


 


  ibuprofen Adverse Reaction Mild UPSET STOMACH 6/8/18 Yes











Physical Exam:


PE:





Constitutional: Well developed, well nourished, no acute distress, non-toxic 

appearance. []


Skin: Warm, dry, no erythema, no rash. [] 


Back: No tenderness, no CVA tenderness. [] 


Extremities: Obese patient, tenderness on palpation of the right lateral hip, 

limited range of motion to the right hip due to pain as well as weight.  Full 

range of motion to the right toes.  +2 right pedal pulse.  Cap refill less than 

2 seconds to the right lower extremity.  Sensation intact to the right lower 

extremity


Neurologic: Alert and oriented X 3, normal motor function, normal sensory 

function, no focal deficits noted. []


Psychologic: Affect normal, judgement normal, mood normal. []





Current Patient Data:


Vital Signs:





                                   Vital Signs








  Date Time  Temp Pulse Resp B/P (MAP) Pulse Ox O2 Delivery O2 Flow Rate FiO2


 


8/5/21 12:42   20     


 


8/5/21 12:07 98.1 78  197/104 98 Room Air  





 98.1       











EKG:


EKG:


[]





Radiology/Procedures:


Radiology/Procedures:


[]PROCEDURE: HIP RIGHT 2V WITH PELVIS





EXAM: Pelvis and right hip, 3 views.





HISTORY: Pain.





COMPARISON: None.





FINDINGS: 3 views of the right hip and pelvis are obtained. There is no 

fracture, dislocation or subluxation. The femoral heads are normal in 

configuration. There is advanced degenerative change involving the lower lumbar 

spine, not formally assessed on this exam.





IMPRESSION: No acute osseous finding.





Electronically signed by: Elle Robison MD (8/5/2021 12:42 PM) [id number]














DICTATED and SIGNED BY:     ELLE ROBISON MD


DATE:     08/05/21 [id number] 0





Course & Med Decision Making:


Course & Med Decision Making


Pertinent Labs and Imaging studies reviewed. (See chart for details)








This is a 36-year-old female presenting to the ED today complaining of right hip

 pain, no known injury.  Right hip x-rays including pelvis interpreted by 

radiologist are negative for any acute findings.  Provided Ortho for follow-up. 

 Provided return precautions and discharged in stable condition





Dragon Disclaimer:


Dragon Disclaimer:


This electronic medical record was generated, in whole or in part, using a voice

 recognition dictation system.





Departure


Departure


Impression:  


   Primary Impression:  


   Right hip pain


Disposition:  01 HOME / SELF CARE / HOMELESS


Condition:  STABLE


Referrals:  


NO PCP (PCP)








ROSE FAJARDO DO


Follow-up in 1 week


Patient Instructions:  Hip Pain





Additional Instructions:  


You were seen for right hip pain, your right hip x-rays were negative for any 

acute findings.  You can use ice or heat to your hip.  We encourage you to try 

and get up and ambulate as much as he can.  We encourage you to follow-up with 

your primary care doctor or the provided orthopedic doctor in 1 week


Scripts


Hydrocodone Bit/Acetaminophen (HYDROCODONE-APAP 5-325  **) 1 Tab Tablet


1 TAB PO PRN Q6HRS PRN for PAIN, #10 TAB 0 Refills


   Prov: MICAELA KIRKPATRICK         8/5/21 


Cyclobenzaprine Hcl (CYCLOBENZAPRINE HCL) 10 Mg Tablet


1 TAB PO TID, #90 TAB


   Prov: MICAELA KIRKPATRICK         8/5/21











MICAELA KIRKPATRICK             Aug 5, 2021 12:48

## 2021-08-05 NOTE — RAD
EXAM: Pelvis and right hip, 3 views.



HISTORY: Pain.



COMPARISON: None.



FINDINGS: 3 views of the right hip and pelvis are obtained. There is no fracture, dislocation or subl
uxation. The femoral heads are normal in configuration. There is advanced degenerative change involvi
ng the lower lumbar spine, not formally assessed on this exam.



IMPRESSION: No acute osseous finding.



Electronically signed by: Elle Robison MD (8/5/2021 12:42 PM) [id number]